# Patient Record
Sex: FEMALE | Race: WHITE | NOT HISPANIC OR LATINO | URBAN - METROPOLITAN AREA
[De-identification: names, ages, dates, MRNs, and addresses within clinical notes are randomized per-mention and may not be internally consistent; named-entity substitution may affect disease eponyms.]

---

## 2024-06-28 ENCOUNTER — OFFICE VISIT (OUTPATIENT)
Dept: URBAN - METROPOLITAN AREA CLINIC 42 | Facility: CLINIC | Age: 39
End: 2024-06-28
Payer: COMMERCIAL

## 2024-06-28 ENCOUNTER — DASHBOARD ENCOUNTERS (OUTPATIENT)
Age: 39
End: 2024-06-28

## 2024-06-28 VITALS
SYSTOLIC BLOOD PRESSURE: 116 MMHG | DIASTOLIC BLOOD PRESSURE: 68 MMHG | TEMPERATURE: 97.8 F | WEIGHT: 188 LBS | BODY MASS INDEX: 33.31 KG/M2 | HEIGHT: 63 IN | RESPIRATION RATE: 20 BRPM | HEART RATE: 60 BPM

## 2024-06-28 DIAGNOSIS — B19.20 HEPATITIS C VIRUS INFECTION WITHOUT HEPATIC COMA, UNSPECIFIED CHRONICITY: ICD-10-CM

## 2024-06-28 PROCEDURE — 99204 OFFICE O/P NEW MOD 45 MIN: CPT | Performed by: INTERNAL MEDICINE

## 2024-06-28 RX ORDER — VELPATASVIR AND SOFOSBUVIR 100; 400 MG/1; MG/1
1 TABLET TABLET, FILM COATED ORAL ONCE A DAY
Qty: 84 TABLET | Refills: 0 | OUTPATIENT
Start: 2024-06-28 | End: 2024-09-20

## 2024-06-28 NOTE — PHYSICAL EXAM PSYCH:
normal mood with appropriate affect
Anesthesia risk alerts addressed and discussed with second provider

## 2024-06-28 NOTE — HPI-TODAY'S VISIT:
This patient a 39 yo female with a history of hep C who is following up for hep C.  Was diagnosed with HCV in 2005.  Has never been on hep C treatment.  No ETOH.  No drug use for 18 months.

## 2024-06-29 LAB
ALBUMIN: 4.6
ALKALINE PHOSPHATASE: 136
ALT (SGPT): 67
AST (SGOT): 39
BILIRUBIN, TOTAL: 0.4
BUN/CREATININE RATIO: 13
BUN: 9
CALCIUM: 9.7
CARBON DIOXIDE, TOTAL: 24
CHLORIDE: 100
CREATININE: 0.71
EGFR: 111
GLOBULIN, TOTAL: 2.8
GLUCOSE: 73
HEMATOCRIT: 46.8
HEMOGLOBIN: 15.1
MCH: 31.5
MCHC: 32.3
MCV: 98
NRBC: (no result)
PLATELETS: 234
POTASSIUM: 4.8
PROTEIN, TOTAL: 7.4
RBC: 4.79
RDW: 12.2
SODIUM: 137
WBC: 7.8

## 2024-07-01 ENCOUNTER — TELEPHONE ENCOUNTER (OUTPATIENT)
Dept: URBAN - METROPOLITAN AREA CLINIC 42 | Facility: CLINIC | Age: 39
End: 2024-07-01

## 2024-07-09 ENCOUNTER — TELEPHONE ENCOUNTER (OUTPATIENT)
Dept: URBAN - METROPOLITAN AREA CLINIC 42 | Facility: CLINIC | Age: 39
End: 2024-07-09

## 2024-07-10 ENCOUNTER — TELEPHONE ENCOUNTER (OUTPATIENT)
Dept: URBAN - METROPOLITAN AREA CLINIC 42 | Facility: CLINIC | Age: 39
End: 2024-07-10

## 2024-07-10 RX ORDER — GLECAPREVIR AND PIBRENTASVIR 40; 100 MG/1; MG/1
3 TABLETS TABLET, FILM COATED ORAL ONCE A DAY
Qty: 168 TABLET | Refills: 0 | OUTPATIENT
Start: 2024-07-16 | End: 2024-09-10

## 2024-07-10 RX ORDER — LEDIPASVIR AND SOFOSBUVIR 90; 400 MG/1; MG/1
1 TABLET TABLET, FILM COATED ORAL ONCE A DAY
Qty: 84 TABLET | Refills: 0 | OUTPATIENT
Start: 2024-07-10 | End: 2024-10-02

## 2024-07-15 ENCOUNTER — OFFICE VISIT (OUTPATIENT)
Dept: URBAN - METROPOLITAN AREA CLINIC 114 | Facility: CLINIC | Age: 39
End: 2024-07-15
Payer: COMMERCIAL

## 2024-07-15 DIAGNOSIS — K80.20 GALLSTONE: ICD-10-CM

## 2024-07-15 DIAGNOSIS — R93.2 ABNORMAL ULTRASOUND OF LIVER: ICD-10-CM

## 2024-07-15 PROCEDURE — 76705 ECHO EXAM OF ABDOMEN: CPT | Performed by: INTERNAL MEDICINE

## 2024-07-15 RX ORDER — LEDIPASVIR AND SOFOSBUVIR 90; 400 MG/1; MG/1
1 TABLET TABLET, FILM COATED ORAL ONCE A DAY
Qty: 84 TABLET | Refills: 0 | Status: ACTIVE | COMMUNITY
Start: 2024-07-10 | End: 2024-10-02

## 2024-07-15 RX ORDER — VELPATASVIR AND SOFOSBUVIR 100; 400 MG/1; MG/1
1 TABLET TABLET, FILM COATED ORAL ONCE A DAY
Qty: 84 TABLET | Refills: 0 | Status: ACTIVE | COMMUNITY
Start: 2024-06-28 | End: 2024-09-20

## 2024-07-23 ENCOUNTER — TELEPHONE ENCOUNTER (OUTPATIENT)
Dept: URBAN - METROPOLITAN AREA CLINIC 42 | Facility: CLINIC | Age: 39
End: 2024-07-23

## 2024-08-15 ENCOUNTER — OFFICE VISIT (OUTPATIENT)
Dept: URBAN - METROPOLITAN AREA CLINIC 82 | Facility: CLINIC | Age: 39
End: 2024-08-15
Payer: COMMERCIAL

## 2024-08-15 VITALS
HEIGHT: 63 IN | BODY MASS INDEX: 33.49 KG/M2 | RESPIRATION RATE: 20 BRPM | TEMPERATURE: 98.1 F | WEIGHT: 189 LBS | DIASTOLIC BLOOD PRESSURE: 65 MMHG | SYSTOLIC BLOOD PRESSURE: 109 MMHG | HEART RATE: 67 BPM

## 2024-08-15 DIAGNOSIS — B19.20 HEPATITIS C VIRUS INFECTION WITHOUT HEPATIC COMA, UNSPECIFIED CHRONICITY: ICD-10-CM

## 2024-08-15 PROCEDURE — 99213 OFFICE O/P EST LOW 20 MIN: CPT | Performed by: INTERNAL MEDICINE

## 2024-08-15 RX ORDER — GLECAPREVIR AND PIBRENTASVIR 40; 100 MG/1; MG/1
3 TABLETS TABLET, FILM COATED ORAL ONCE A DAY
Qty: 168 TABLET | Refills: 0 | Status: ACTIVE | COMMUNITY
Start: 2024-07-16 | End: 2024-09-10

## 2024-08-15 NOTE — HPI-TODAY'S VISIT:
This patient a 37 yo female with a history of hep C who is following up for hep C.  Was diagnosed with HCV in 2005.  Has never been on hep C treatment.  No ETOH.  No drug use for 18 months.  8/15/24 Completed 2 weeks of Mavyret.  Feeling well.  No problems.

## 2024-10-24 ENCOUNTER — OFFICE VISIT (OUTPATIENT)
Dept: URBAN - METROPOLITAN AREA CLINIC 82 | Facility: CLINIC | Age: 39
End: 2024-10-24

## 2024-12-13 ENCOUNTER — OFFICE VISIT (OUTPATIENT)
Dept: URBAN - METROPOLITAN AREA CLINIC 42 | Facility: CLINIC | Age: 39
End: 2024-12-13

## 2025-01-21 ENCOUNTER — LAB OUTSIDE AN ENCOUNTER (OUTPATIENT)
Dept: URBAN - METROPOLITAN AREA CLINIC 42 | Facility: CLINIC | Age: 40
End: 2025-01-21

## 2025-01-23 ENCOUNTER — OFFICE VISIT (OUTPATIENT)
Dept: URBAN - METROPOLITAN AREA CLINIC 42 | Facility: CLINIC | Age: 40
End: 2025-01-23
Payer: COMMERCIAL

## 2025-01-23 ENCOUNTER — TELEPHONE ENCOUNTER (OUTPATIENT)
Dept: URBAN - METROPOLITAN AREA CLINIC 42 | Facility: CLINIC | Age: 40
End: 2025-01-23

## 2025-01-23 VITALS
BODY MASS INDEX: 34.38 KG/M2 | HEART RATE: 77 BPM | TEMPERATURE: 97.4 F | HEIGHT: 63 IN | SYSTOLIC BLOOD PRESSURE: 131 MMHG | WEIGHT: 194 LBS | DIASTOLIC BLOOD PRESSURE: 75 MMHG

## 2025-01-23 DIAGNOSIS — B19.20 HEPATITIS C VIRUS INFECTION WITHOUT HEPATIC COMA, UNSPECIFIED CHRONICITY: ICD-10-CM

## 2025-01-23 PROCEDURE — 99213 OFFICE O/P EST LOW 20 MIN: CPT | Performed by: INTERNAL MEDICINE

## 2025-01-23 NOTE — HPI-TODAY'S VISIT:
This patient a 37 yo female with a history of hep C who is following up for hep C.  Was diagnosed with HCV in 2005.  Has never been on hep C treatment.  No ETOH.  No drug use for 18 months.  8/15/24 Completed 2 weeks of Mavyret.  Feeling well.  No problems.  1/23/25 Completed therapy but didn't get labs check during therapy.  Here for a follow-up.  Got labs done yesterday.

## 2025-01-23 NOTE — PHYSICAL EXAM CONSTITUTIONAL:
well developed, well nourished , in no acute distress , ambulating without difficulty , normal communication ability
05-Apr-2020 07:47

## 2025-01-24 ENCOUNTER — TELEPHONE ENCOUNTER (OUTPATIENT)
Dept: URBAN - METROPOLITAN AREA CLINIC 42 | Facility: CLINIC | Age: 40
End: 2025-01-24

## 2025-01-24 LAB
ALBUMIN: 4.1
ALKALINE PHOSPHATASE: 92
ALT (SGPT): 10
AST (SGOT): 16
BILIRUBIN, TOTAL: <0.2
BUN/CREATININE RATIO: 24
BUN: 14
CALCIUM: 9.2
CARBON DIOXIDE, TOTAL: 19
CHLORIDE: 100
CREATININE: 0.58
EGFR: 117
GLOBULIN, TOTAL: 2.6
GLUCOSE: 101
HEP C VIRUS AB: REACTIVE
POTASSIUM: 3.7
PROTEIN, TOTAL: 6.7
SODIUM: 136

## 2025-02-11 ENCOUNTER — TELEPHONE ENCOUNTER (OUTPATIENT)
Dept: URBAN - METROPOLITAN AREA CLINIC 42 | Facility: CLINIC | Age: 40
End: 2025-02-11